# Patient Record
Sex: FEMALE | Race: WHITE | NOT HISPANIC OR LATINO | URBAN - METROPOLITAN AREA
[De-identification: names, ages, dates, MRNs, and addresses within clinical notes are randomized per-mention and may not be internally consistent; named-entity substitution may affect disease eponyms.]

---
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
## 2019-11-10 ENCOUNTER — EMERGENCY (EMERGENCY)
Facility: HOSPITAL | Age: 19
LOS: 1 days | Discharge: HOME | End: 2019-11-10
Attending: STUDENT IN AN ORGANIZED HEALTH CARE EDUCATION/TRAINING PROGRAM
Payer: COMMERCIAL

## 2019-11-10 VITALS
DIASTOLIC BLOOD PRESSURE: 78 MMHG | HEIGHT: 63 IN | RESPIRATION RATE: 16 BRPM | TEMPERATURE: 98 F | HEART RATE: 83 BPM | OXYGEN SATURATION: 97 % | SYSTOLIC BLOOD PRESSURE: 111 MMHG | WEIGHT: 110.01 LBS

## 2019-11-10 VITALS
SYSTOLIC BLOOD PRESSURE: 103 MMHG | DIASTOLIC BLOOD PRESSURE: 68 MMHG | TEMPERATURE: 99 F | OXYGEN SATURATION: 100 % | RESPIRATION RATE: 16 BRPM | HEART RATE: 72 BPM

## 2019-11-10 PROCEDURE — 99283 EMERGENCY DEPT VISIT LOW MDM: CPT

## 2019-11-10 RX ORDER — SODIUM CHLORIDE 9 MG/ML
1 INJECTION INTRAMUSCULAR; INTRAVENOUS; SUBCUTANEOUS
Qty: 1 | Refills: 0
Start: 2019-11-10 | End: 2019-11-12

## 2019-11-10 RX ADMIN — Medication 600 MILLIGRAM(S): at 02:23

## 2019-11-10 NOTE — ED PROVIDER NOTE - CLINICAL SUMMARY MEDICAL DECISION MAKING FREE TEXT BOX
19F no pmh p/w cough, chest congestion that started yesterday. nontoxic appearing, lung ctab, speaking in full sentences, not in respiratory distress. no focal lung findings suggestive of pna at this time. likely viral syndrome, will give symptomatic control, dc

## 2019-11-10 NOTE — ED PROVIDER NOTE - PHYSICAL EXAMINATION
Vitals: WNL  Gen: laying comfortably in NAD, congested sounding  Head: NCAT  ENT: sclerae white, anicterus, moist mucous membranes.   CV: RRR. Audible S1 and S2. No murmurs, rubs, gallops, S3, nor S4, 2+ radial and DP pulses   Pulm: Clear to auscultation bilaterally. No wheezes, rales, or rhonchi  Abd: soft, normoactive BS x4, NTND, no rebound, no guarding, no rashes  Musculoskeletal:  No peripheral edema  Skin: no lesions or scars noted  Neurologic: AAOx3  : no CVA tenderness  Psych: normal affect

## 2019-11-10 NOTE — ED PROVIDER NOTE - NS ED ROS FT
Gen: No fever, normal appetite  Eyes: No eye irritation or discharge  ENT: No earpain, congestion, sore throat  Resp: +cough + trouble breathing  Cardiovascular: No chest pain or palpitation  Gastroenteric: No nausea/vomiting, diarrhea, constipation  : No dysuria  MS: No joint or muscle pain  Skin: No rashes  Neuro: No headache  Remainder negative, except as per the HPI

## 2019-11-10 NOTE — ED ADULT NURSE NOTE - OBJECTIVE STATEMENT
18 yo F w/ no PMHx presents to ED via walk in c/o cough/congestion. Pt states symptoms began yesterday, worsened today. Says that it feels difficult to breath out but denies difficulty w/ inspiration. No known sick contacts or recent travel. States cough has been nonproductive but is wet sounding. Took robitussin at home w/ minimal relief in symptoms. Pt denies any CP, SOB, N/V, fever, chills, urinary complaints, constipation, diarrhea, HA, dizziness, weakness. Pt A&Ox4, lungs CTA, +central pulses. Abdomen soft, not tender, not distended. Ambulating w/ steady gait, safety and comfort maintained, no acute distress noted at this time.

## 2019-11-10 NOTE — ED PROVIDER NOTE - PATIENT PORTAL LINK FT
You can access the FollowMyHealth Patient Portal offered by [facility] by registering at the following website: [email or web address]. By joining [facility]’s FollowMyHealth portal, you will also be able to view your health information using other applications (apps) compatible with our system.

## 2019-11-10 NOTE — ED ADULT NURSE NOTE - CHPI ED NUR SYMPTOMS NEG
no chest pain/no chills/no hemoptysis/no diaphoresis/no wheezing/no body aches/no shortness of breath/no edema/no fever/no headache

## 2019-11-10 NOTE — ED PROVIDER NOTE - OBJECTIVE STATEMENT
19F no pmh p/w cough, chest congestion that started yesterday. feels like she can breathe in but cannot breathe out. 19F no pmh p/w cough, chest congestion that started yesterday. feels like she can breathe in but cannot breathe out. denies f/c, sick contacts. took robitussin which said didn't help

## 2019-11-10 NOTE — ED PROVIDER NOTE - ATTENDING CONTRIBUTION TO CARE
Attending MD Salazar:  I personally have seen and examined this patient.  Resident note reviewed and agree on plan of care and except where noted.    congestion  CTABL no W/R/R  saline nebs now and for dc  continue mucinex  dc home

## 2021-03-27 ENCOUNTER — TRANSCRIPTION ENCOUNTER (OUTPATIENT)
Age: 21
End: 2021-03-27

## 2021-03-29 ENCOUNTER — APPOINTMENT (OUTPATIENT)
Dept: ORTHOPEDIC SURGERY | Facility: CLINIC | Age: 21
End: 2021-03-29
Payer: MEDICAID

## 2021-03-29 VITALS
DIASTOLIC BLOOD PRESSURE: 66 MMHG | SYSTOLIC BLOOD PRESSURE: 103 MMHG | HEART RATE: 86 BPM | HEIGHT: 63 IN | WEIGHT: 122 LBS | BODY MASS INDEX: 21.62 KG/M2

## 2021-03-29 DIAGNOSIS — M25.531 PAIN IN RIGHT WRIST: ICD-10-CM

## 2021-03-29 PROCEDURE — 29075 APPL CST ELBW FNGR SHORT ARM: CPT | Mod: RT

## 2021-03-29 PROCEDURE — 99204 OFFICE O/P NEW MOD 45 MIN: CPT | Mod: 25

## 2021-03-29 PROCEDURE — 99072 ADDL SUPL MATRL&STAF TM PHE: CPT

## 2021-04-07 ENCOUNTER — APPOINTMENT (OUTPATIENT)
Dept: MRI IMAGING | Facility: IMAGING CENTER | Age: 21
End: 2021-04-07
Payer: MEDICAID

## 2021-04-07 ENCOUNTER — RESULT REVIEW (OUTPATIENT)
Age: 21
End: 2021-04-07

## 2021-04-07 ENCOUNTER — OUTPATIENT (OUTPATIENT)
Dept: OUTPATIENT SERVICES | Facility: HOSPITAL | Age: 21
LOS: 1 days | End: 2021-04-07
Payer: MEDICAID

## 2021-04-07 DIAGNOSIS — M25.531 PAIN IN RIGHT WRIST: ICD-10-CM

## 2021-04-07 DIAGNOSIS — Z00.8 ENCOUNTER FOR OTHER GENERAL EXAMINATION: ICD-10-CM

## 2021-04-07 PROCEDURE — 73221 MRI JOINT UPR EXTREM W/O DYE: CPT

## 2021-04-07 PROCEDURE — 73221 MRI JOINT UPR EXTREM W/O DYE: CPT | Mod: 26,RT

## 2021-04-19 ENCOUNTER — APPOINTMENT (OUTPATIENT)
Dept: ORTHOPEDIC SURGERY | Facility: CLINIC | Age: 21
End: 2021-04-19
Payer: MEDICAID

## 2021-04-19 PROCEDURE — 29075 APPL CST ELBW FNGR SHORT ARM: CPT | Mod: RT

## 2021-04-19 PROCEDURE — 99072 ADDL SUPL MATRL&STAF TM PHE: CPT

## 2021-04-19 PROCEDURE — 99214 OFFICE O/P EST MOD 30 MIN: CPT | Mod: 25

## 2021-04-19 PROCEDURE — 73110 X-RAY EXAM OF WRIST: CPT | Mod: RT

## 2021-05-19 ENCOUNTER — APPOINTMENT (OUTPATIENT)
Dept: ORTHOPEDIC SURGERY | Facility: CLINIC | Age: 21
End: 2021-05-19
Payer: MEDICAID

## 2021-05-19 PROCEDURE — 73110 X-RAY EXAM OF WRIST: CPT | Mod: RT

## 2021-05-19 PROCEDURE — 99072 ADDL SUPL MATRL&STAF TM PHE: CPT

## 2021-05-19 PROCEDURE — 99213 OFFICE O/P EST LOW 20 MIN: CPT

## 2021-05-23 ENCOUNTER — APPOINTMENT (OUTPATIENT)
Dept: CT IMAGING | Facility: IMAGING CENTER | Age: 21
End: 2021-05-23
Payer: MEDICAID

## 2021-05-23 ENCOUNTER — OUTPATIENT (OUTPATIENT)
Dept: OUTPATIENT SERVICES | Facility: HOSPITAL | Age: 21
LOS: 1 days | End: 2021-05-23
Payer: MEDICAID

## 2021-05-23 DIAGNOSIS — S62.024D NONDISPLACED FRACTURE OF MIDDLE THIRD OF NAVICULAR [SCAPHOID] BONE OF RIGHT WRIST, SUBSEQUENT ENCOUNTER FOR FRACTURE WITH ROUTINE HEALING: ICD-10-CM

## 2021-05-23 DIAGNOSIS — U07.1 COVID-19: ICD-10-CM

## 2021-05-23 DIAGNOSIS — Z00.8 ENCOUNTER FOR OTHER GENERAL EXAMINATION: ICD-10-CM

## 2021-05-23 PROCEDURE — 73200 CT UPPER EXTREMITY W/O DYE: CPT

## 2021-05-23 PROCEDURE — 73200 CT UPPER EXTREMITY W/O DYE: CPT | Mod: 26,RT

## 2021-05-26 ENCOUNTER — APPOINTMENT (OUTPATIENT)
Dept: ORTHOPEDIC SURGERY | Facility: CLINIC | Age: 21
End: 2021-05-26
Payer: MEDICAID

## 2021-05-26 DIAGNOSIS — S62.024D NONDISPLACED FRACTURE OF MIDDLE THIRD OF NAVICULAR [SCAPHOID] BONE OF RIGHT WRIST, SUBSEQUENT ENCOUNTER FOR FRACTURE WITH ROUTINE HEALING: ICD-10-CM

## 2021-05-26 PROCEDURE — 99213 OFFICE O/P EST LOW 20 MIN: CPT

## 2021-05-26 PROCEDURE — 99072 ADDL SUPL MATRL&STAF TM PHE: CPT

## 2023-02-01 ENCOUNTER — APPOINTMENT (OUTPATIENT)
Dept: PODIATRY | Facility: CLINIC | Age: 23
End: 2023-02-01
Payer: COMMERCIAL

## 2023-02-01 DIAGNOSIS — M77.31 CALCANEAL SPUR, RIGHT FOOT: ICD-10-CM

## 2023-02-01 DIAGNOSIS — M72.2 PLANTAR FASCIAL FIBROMATOSIS: ICD-10-CM

## 2023-02-01 DIAGNOSIS — M77.32 CALCANEAL SPUR, RIGHT FOOT: ICD-10-CM

## 2023-02-01 DIAGNOSIS — M79.671 PAIN IN RIGHT FOOT: ICD-10-CM

## 2023-02-01 DIAGNOSIS — M79.672 PAIN IN RIGHT FOOT: ICD-10-CM

## 2023-02-01 PROCEDURE — 73630 X-RAY EXAM OF FOOT: CPT | Mod: RT

## 2023-02-01 PROCEDURE — 99203 OFFICE O/P NEW LOW 30 MIN: CPT | Mod: 25

## 2023-02-03 PROBLEM — M79.671 PAIN IN BOTH FEET: Status: ACTIVE | Noted: 2023-02-02

## 2023-02-03 PROBLEM — M77.31 CALCANEAL SPUR OF BOTH FEET: Status: ACTIVE | Noted: 2023-02-02

## 2023-02-03 PROBLEM — M72.2 PLANTAR FASCIITIS: Status: ACTIVE | Noted: 2023-02-02

## 2023-02-03 NOTE — ASSESSMENT
[FreeTextEntry1] : \par Impression: Plantar fasciitis. Heel spur syndrome. Pain.\par \par Treatment: I gave her a series of stretching exercises. I discussed Powerstep arch supports. I discussed anti-inflammatories for the next couple of days. With continued numbness and pain I discussed orthopedic vs. neurology consultation. In the meantime it was bilateral symmetrical leg with no sign of DVT or clot. No sign of weakness or imbalance noted. Good strength when testing all muscle groups.

## 2023-02-03 NOTE — PHYSICAL EXAM
[2+] : left foot dorsalis pedis 2+ [Skin Color & Pigmentation] : normal skin color and pigmentation [Skin Turgor] : normal skin turgor [] : no rash [Skin Lesions] : no skin lesions [Sensation] : the sensory exam was normal to light touch and pinprick [No Focal Deficits] : no focal deficits [Deep Tendon Reflexes (DTR)] : deep tendon reflexes were 2+ and symmetric [Motor Exam] : the motor exam was normal [Ankle Swelling (On Exam)] : not present [Varicose Veins Of Lower Extremities] : not present [Delayed in the Right Toes] : capillary refills normal in right toes [Delayed in the Left Toes] : capillary refills normal in the left toes [FreeTextEntry3] : Hair growth noted on digits. Proximal to distal cooling is within normal limits.  [de-identified] : She has equinus and tight posterior muscle group. There is a little bit of sensitivity with right side leg raise. It could be from sciatica. She has inferior calcaneal plantar fasciitis at the medial insertion bilateral, right side worse than left.  [Skin Induration] : no skin induration [Foot Ulcer] : no foot ulcer

## 2023-02-03 NOTE — PROCEDURE
[FreeTextEntry1] : X-ray Report: Bilateral foot - 3 views taken in order to rule out spur. Demonstrates cavus foot with no sign of spur. no fracture. No osteochondral lesion or arthrosis.

## 2023-02-03 NOTE — HISTORY OF PRESENT ILLNESS
[Sneakers] : nataliia [FreeTextEntry1] : Patient presents today with her mom for evaluation of heel pain bilateral. Patient has numbness which is not evident today. She does have a history of back issues. The pain is more right side worse than left.

## 2023-11-04 NOTE — ED ADULT TRIAGE NOTE - AS HEIGHT TYPE
PTA stated

## 2024-03-08 ENCOUNTER — LABORATORY RESULT (OUTPATIENT)
Age: 24
End: 2024-03-08

## 2024-03-08 ENCOUNTER — APPOINTMENT (OUTPATIENT)
Dept: PODIATRY | Facility: CLINIC | Age: 24
End: 2024-03-08
Payer: COMMERCIAL

## 2024-03-08 DIAGNOSIS — B35.1 TINEA UNGUIUM: ICD-10-CM

## 2024-03-08 DIAGNOSIS — L60.3 NAIL DYSTROPHY: ICD-10-CM

## 2024-03-08 PROCEDURE — 99213 OFFICE O/P EST LOW 20 MIN: CPT

## 2024-03-11 NOTE — HISTORY OF PRESENT ILLNESS
[FreeTextEntry1] : Patient presents today with concerns of discoloration on her bilateral hallux nails. The patient states she does go for pedicures somewhat regularly, more than before, and she noticed a discoloration on her nails especially both big toenails. Patient and her mother were just concerned regarding if there was a fungal infection. She denies any pain at this time. She does present wearing a comfortable type sneaker. She is on her feet most of the time. She denies any other pedal complaints at this time.

## 2024-03-11 NOTE — ASSESSMENT
[FreeTextEntry1] : Impression: Onychodystrophy. R/O onychomycosis.  Treatment; Discussed findings and conditions with the patient and patient's mother. Discussed nail care with the patient. She was advised to Lysol inside all shoes. Bleach with white sock with laundry. She is to avoid walking barefoot. Patient is to avoid pedicures at this time. Advised soaking the toes in white vinegar and water in a one-to-one mixture for approximately 10 minutes per day and gently cleanse with a nail brush or soft bristle toothbrush, and dry well. Discussed obtaining a nail culture for KOH to rule out fungal nail. Patient agreed. The nail was prepped, and a nail specimen was sent for culture to rule out fungal nail. Discussed potential treatment if the valencia does return fungal as far as topical vs. oral vs. laser therapy.  Treatment is pending culture results. With patient's consent, the nails were prepped and trimmed to appropriate length and were manually burred to remove the superficial layer, but residual discoloration remains. Shoe gear was also discussed with the patient and the patient will be followed up for further recommendations and treatment options. I recommended Dr. Pollard as potential options for nail polishes for the patient. good balance

## 2024-03-11 NOTE — PHYSICAL EXAM
[2+] : right foot dorsalis pedis 2+ [Varicose Veins Of Lower Extremities] : not present [FreeTextEntry3] : Temperature gradient within normal limits. CFT: 3 seconds x10.  Negative hair growth.  [de-identified] : Muscle strength is 5/5 bilaterally.  [FreeTextEntry1] : Epicritic sensation and light touch are intact.

## 2024-05-10 ENCOUNTER — NON-APPOINTMENT (OUTPATIENT)
Age: 24
End: 2024-05-10

## 2025-01-02 ENCOUNTER — APPOINTMENT (OUTPATIENT)
Dept: ORTHOPEDIC SURGERY | Facility: CLINIC | Age: 25
End: 2025-01-02